# Patient Record
Sex: FEMALE | Race: WHITE | NOT HISPANIC OR LATINO | Employment: OTHER | URBAN - METROPOLITAN AREA
[De-identification: names, ages, dates, MRNs, and addresses within clinical notes are randomized per-mention and may not be internally consistent; named-entity substitution may affect disease eponyms.]

---

## 2021-08-19 ENCOUNTER — LAB (OUTPATIENT)
Dept: LAB | Facility: CLINIC | Age: 86
End: 2021-08-19
Payer: COMMERCIAL

## 2021-08-19 DIAGNOSIS — Z79.01 LONG TERM (CURRENT) USE OF ANTICOAGULANTS: Primary | ICD-10-CM

## 2021-08-19 LAB — INR BLD: 1.1 (ref 0.9–1.1)

## 2021-08-19 PROCEDURE — 85610 PROTHROMBIN TIME: CPT

## 2021-08-19 PROCEDURE — 36416 COLLJ CAPILLARY BLOOD SPEC: CPT

## 2021-08-21 ENCOUNTER — HOSPITAL ENCOUNTER (EMERGENCY)
Facility: CLINIC | Age: 86
Discharge: HOME | End: 2021-08-22
Attending: NURSE PRACTITIONER | Admitting: NURSE PRACTITIONER
Payer: COMMERCIAL

## 2021-08-21 ENCOUNTER — APPOINTMENT (OUTPATIENT)
Dept: GENERAL RADIOLOGY | Facility: CLINIC | Age: 86
End: 2021-08-21
Attending: NURSE PRACTITIONER
Payer: COMMERCIAL

## 2021-08-21 VITALS
RESPIRATION RATE: 22 BRPM | SYSTOLIC BLOOD PRESSURE: 154 MMHG | HEART RATE: 89 BPM | OXYGEN SATURATION: 93 % | DIASTOLIC BLOOD PRESSURE: 83 MMHG | TEMPERATURE: 99.3 F | WEIGHT: 185 LBS

## 2021-08-21 DIAGNOSIS — K59.00 CONSTIPATION: ICD-10-CM

## 2021-08-21 PROCEDURE — 250N000013 HC RX MED GY IP 250 OP 250 PS 637: Performed by: NURSE PRACTITIONER

## 2021-08-21 PROCEDURE — 74019 RADEX ABDOMEN 2 VIEWS: CPT

## 2021-08-21 PROCEDURE — 99283 EMERGENCY DEPT VISIT LOW MDM: CPT

## 2021-08-21 PROCEDURE — 99284 EMERGENCY DEPT VISIT MOD MDM: CPT | Performed by: NURSE PRACTITIONER

## 2021-08-21 RX ORDER — CEFADROXIL 500 MG/1
1000 CAPSULE ORAL
COMMUNITY
Start: 2021-08-18 | End: 2021-08-23

## 2021-08-21 RX ORDER — OMEPRAZOLE 40 MG/1
40 CAPSULE, DELAYED RELEASE ORAL
COMMUNITY
Start: 2020-08-03

## 2021-08-21 RX ORDER — CALCIUM POLYCARBOPHIL 625 MG
625 TABLET ORAL
COMMUNITY

## 2021-08-21 RX ORDER — CHLORAL HYDRATE 500 MG
1 CAPSULE ORAL
COMMUNITY

## 2021-08-21 RX ORDER — ALENDRONATE SODIUM 70 MG/1
70 TABLET ORAL
COMMUNITY
Start: 2021-02-02

## 2021-08-21 RX ORDER — HYDROCODONE BITARTRATE AND ACETAMINOPHEN 5; 325 MG/1; MG/1
TABLET ORAL
COMMUNITY
Start: 2021-08-18

## 2021-08-21 RX ORDER — ALBUTEROL SULFATE 90 UG/1
2 AEROSOL, METERED RESPIRATORY (INHALATION)
COMMUNITY

## 2021-08-21 RX ORDER — PRAVASTATIN SODIUM 80 MG/1
80 TABLET ORAL
COMMUNITY
Start: 2020-11-24

## 2021-08-21 RX ORDER — AMOXICILLIN 250 MG
1 CAPSULE ORAL AT BEDTIME
Qty: 20 TABLET | Refills: 0 | Status: SHIPPED | OUTPATIENT
Start: 2021-08-21

## 2021-08-21 RX ORDER — ALPRAZOLAM 0.25 MG
0.25 TABLET ORAL
COMMUNITY
Start: 2021-03-11

## 2021-08-21 RX ORDER — FLUOXETINE 40 MG/1
80 CAPSULE ORAL DAILY
COMMUNITY

## 2021-08-21 RX ORDER — ACETAMINOPHEN 325 MG/1
650 TABLET ORAL
COMMUNITY
Start: 2021-08-18 | End: 2021-08-24

## 2021-08-21 RX ORDER — AMOXICILLIN 250 MG
2 CAPSULE ORAL ONCE
Status: COMPLETED | OUTPATIENT
Start: 2021-08-21 | End: 2021-08-22

## 2021-08-21 RX ORDER — TRAZODONE HYDROCHLORIDE 100 MG/1
50 TABLET ORAL
COMMUNITY
Start: 2020-08-03

## 2021-08-21 RX ORDER — WARFARIN SODIUM 5 MG/1
5 TABLET ORAL DAILY
COMMUNITY
Start: 2021-05-24

## 2021-08-21 RX ADMIN — DOCUSATE SODIUM 286 ML: 50 LIQUID ORAL at 23:09

## 2021-08-21 ASSESSMENT — ENCOUNTER SYMPTOMS
SLEEP DISTURBANCE: 0
CHILLS: 0
COUGH: 0
BRUISES/BLEEDS EASILY: 1
HEADACHES: 0
DIAPHORESIS: 0
ABDOMINAL DISTENTION: 1
SHORTNESS OF BREATH: 0
LIGHT-HEADEDNESS: 0
ARTHRALGIAS: 1

## 2021-08-22 PROCEDURE — 250N000013 HC RX MED GY IP 250 OP 250 PS 637: Performed by: NURSE PRACTITIONER

## 2021-08-22 RX ADMIN — SENNOSIDES-DOCUSATE SODIUM TAB 8.6-50 MG 2 TABLET: 8.6-5 TAB at 00:06

## 2021-08-22 NOTE — ED TRIAGE NOTES
Patient fell on Monday, she had a radial/ulnar fractures that were surgically repaired. She was discharged from the hospital on Wednesday. She has not had a BM since then. She is also having low grade fevers.

## 2021-08-22 NOTE — ED PROVIDER NOTES
Triage Note  [id number] Patient fell on Monday, she had a radial/ulnar fractures that were surgically repaired. She was discharged from the hospital on Wednesday. She has not had a BM since then. She is also having low grade fevers.       History     Chief Complaint   Patient presents with     Post-op Problem     HPI  Whitley Roa is a 88 year old female who fell on Monday and had external fixator placement to right wrist 8/16/2021 on Percocet for pain presenting with no bowel movement since 8/16/2021 and now noting lower back pain with nausea concerning for ileus vs SBO. She reports she was passing gas up till today. She took Miralax and prune juice over past 2 days without change.     PCP: No Ref-Primary, Physician     Allergies:  No Known Allergies    Problem List:    There are no problems to display for this patient.       Past Medical History:    No past medical history on file.    Past Surgical History:    No past surgical history on file.    Family History:    No family history on file.    Social History:  Marital Status:  Single [1]  Social History     Tobacco Use     Smoking status: Not on file   Substance Use Topics     Alcohol use: Not on file     Drug use: Not on file        Medications:    acetaminophen (TYLENOL) 325 MG tablet  alendronate (FOSAMAX) 70 MG tablet  cefadroxil (DURICEF) 500 MG capsule  enoxaparin ANTICOAGULANT (LOVENOX) 80 MG/0.8ML syringe  omeprazole (PRILOSEC) 40 MG DR capsule  pravastatin (PRAVACHOL) 80 MG tablet  Probiotic Product (MISC INTESTINAL SADIQ REGULAT) CAPS  senna-docusate (SENOKOT-S/PERICOLACE) 8.6-50 MG tablet  traZODone (DESYREL) 100 MG tablet  albuterol (PROAIR HFA/PROVENTIL HFA/VENTOLIN HFA) 108 (90 Base) MCG/ACT inhaler  ALPRAZolam (XANAX) 0.25 MG tablet  Calcium Carb-Cholecalciferol 600-800 MG-UNIT TABS  Calcium Polycarbophil (FIBER) 625 MG tablet  cholecalciferol (VITAMIN D3) 25 mcg (1000 units) capsule  fish oil-omega-3 fatty acids 1000 MG capsule  FLUoxetine (PROZAC)  40 MG capsule  HYDROcodone-acetaminophen (NORCO) 5-325 MG tablet  warfarin ANTICOAGULANT (COUMADIN) 5 MG tablet          Review of Systems   Constitutional: Negative for chills and diaphoresis.   HENT: Negative.    Respiratory: Negative for cough and shortness of breath.    Gastrointestinal: Positive for abdominal distention.   Genitourinary: Negative.    Musculoskeletal: Positive for arthralgias.   Skin: Negative for pallor.   Neurological: Negative for light-headedness and headaches.   Hematological: Bruises/bleeds easily.   Psychiatric/Behavioral: Negative for sleep disturbance.       Physical Exam   BP: (!) 154/83  Pulse: 89  Temp: 99.3  F (37.4  C)  Resp: 22  Weight: 83.9 kg (185 lb)  SpO2: 93 %      Physical Exam  Vitals and nursing note reviewed.   Constitutional:       Appearance: Normal appearance.   HENT:      Head: Normocephalic and atraumatic.   Eyes:      Conjunctiva/sclera: Conjunctivae normal.   Cardiovascular:      Rate and Rhythm: Normal rate and regular rhythm.      Pulses: Normal pulses.      Heart sounds: Normal heart sounds.   Pulmonary:      Effort: Pulmonary effort is normal.      Breath sounds: Normal breath sounds.   Abdominal:      General: Bowel sounds are increased. There is distension.      Palpations: Abdomen is soft.      Tenderness: There is no abdominal tenderness.       Skin:     General: Skin is warm and dry.      Findings: Bruising present.      Comments: Bruising and swelling to her right hand and arm there is a cast in place to the right wrist neurovascular intact.   Neurological:      Mental Status: She is alert.         ED Course  Discussed with patient and daughter would like to obtain xray abdominal series eval for ileus vs bowel obstruction prior to considering enema    8861 patient had a moderate hard stool post enema.  She reports she is feeling already much better.  She received to Yanet-Colace capsules here in the ED.  Instructed continue with MiraLAX in the morning.   She is to take a Yanet-Colace capsule every night while she continues on oxycodone for her wrist pain post surgery.  Instructed to increase her oral intake of fluids to assist the MiraLAX and working.  All questions answered and patient discharged home in stable condition.        Procedures                Results for orders placed or performed during the hospital encounter of 08/21/21 (from the past 24 hour(s))   XR Abdomen 2 Views    Narrative    ABDOMEN TWO-THREE VIEW  8/21/2021 9:56 PM     HISTORY: constipation nausea; eval for SBO vs ileus s/p surgery 5 days  ago    COMPARISON: None.    FINDINGS: Large amount of stool. No free air. Nonspecific bowel gas  pattern, difficult to tell if some air-filled loops are colon or small  bowel. The colon is not distended. The lung bases are unremarkable.      Impression    IMPRESSION: Nonspecific bowel gas pattern.    MANSOOR LOYA MD         SYSTEM ID:  [id number]       Medications   senna-docusate (SENOKOT-S/PERICOLACE) 8.6-50 MG per tablet 2 tablet (has no administration in time range)   pink lady enema (COMPOUNDED: docusate, magnesium citrate, mineral oil, sodium phosphate) (286 mLs Rectal Given 8/21/21 5043)       Assessments & Plan (with Medical Decision Making)     I have reviewed the nursing notes.    I have reviewed the findings, diagnosis, plan and need for follow up with the patient.      New Prescriptions    SENNA-DOCUSATE (SENOKOT-S/PERICOLACE) 8.6-50 MG TABLET    Take 1 tablet by mouth At Bedtime       Final diagnoses:   Constipation       8/21/2021   [facility] EMERGENCY DEPT     Irma Londono, SANDOR CNP  08/21/21 5271

## 2021-08-24 ENCOUNTER — HOSPITAL ENCOUNTER (EMERGENCY)
Facility: CLINIC | Age: 86
Discharge: HOME | End: 2021-08-24
Attending: NURSE PRACTITIONER | Admitting: NURSE PRACTITIONER
Payer: COMMERCIAL

## 2021-08-24 VITALS
RESPIRATION RATE: 20 BRPM | DIASTOLIC BLOOD PRESSURE: 63 MMHG | TEMPERATURE: 98.4 F | SYSTOLIC BLOOD PRESSURE: 122 MMHG | OXYGEN SATURATION: 99 % | HEART RATE: 87 BPM | WEIGHT: 185 LBS

## 2021-08-24 DIAGNOSIS — K92.1 BLACK STOOL: ICD-10-CM

## 2021-08-24 LAB
ANION GAP SERPL CALCULATED.3IONS-SCNC: 1 MMOL/L (ref 3–14)
BASOPHILS # BLD AUTO: 0 10E3/UL (ref 0–0.2)
BASOPHILS NFR BLD AUTO: 1 %
BUN SERPL-MCNC: 15 MG/DL (ref 7–30)
CALCIUM SERPL-MCNC: 8.7 MG/DL (ref 8.5–10.1)
CHLORIDE BLD-SCNC: 105 MMOL/L (ref 94–109)
CO2 SERPL-SCNC: 31 MMOL/L (ref 20–32)
CREAT SERPL-MCNC: 0.74 MG/DL (ref 0.52–1.04)
EOSINOPHIL # BLD AUTO: 0.3 10E3/UL (ref 0–0.7)
EOSINOPHIL NFR BLD AUTO: 6 %
ERYTHROCYTE [DISTWIDTH] IN BLOOD BY AUTOMATED COUNT: 13.6 % (ref 10–15)
GFR SERPL CREATININE-BSD FRML MDRD: 73 ML/MIN/1.73M2
GLUCOSE BLD-MCNC: 143 MG/DL (ref 70–99)
HCT VFR BLD AUTO: 36 % (ref 35–47)
HGB BLD-MCNC: 11.9 G/DL (ref 11.7–15.7)
IMM GRANULOCYTES # BLD: 0 10E3/UL
IMM GRANULOCYTES NFR BLD: 1 %
INR PPP: 1.62 (ref 0.85–1.15)
LYMPHOCYTES # BLD AUTO: 0.8 10E3/UL (ref 0.8–5.3)
LYMPHOCYTES NFR BLD AUTO: 15 %
MCH RBC QN AUTO: 30.9 PG (ref 26.5–33)
MCHC RBC AUTO-ENTMCNC: 33.1 G/DL (ref 31.5–36.5)
MCV RBC AUTO: 94 FL (ref 78–100)
MONOCYTES # BLD AUTO: 0.7 10E3/UL (ref 0–1.3)
MONOCYTES NFR BLD AUTO: 13 %
NEUTROPHILS # BLD AUTO: 3.4 10E3/UL (ref 1.6–8.3)
NEUTROPHILS NFR BLD AUTO: 64 %
NRBC # BLD AUTO: 0 10E3/UL
NRBC BLD AUTO-RTO: 0 /100
PLATELET # BLD AUTO: 205 10E3/UL (ref 150–450)
POTASSIUM BLD-SCNC: 4.2 MMOL/L (ref 3.4–5.3)
RBC # BLD AUTO: 3.85 10E6/UL (ref 3.8–5.2)
SODIUM SERPL-SCNC: 137 MMOL/L (ref 133–144)
WBC # BLD AUTO: 5.2 10E3/UL (ref 4–11)

## 2021-08-24 PROCEDURE — 36415 COLL VENOUS BLD VENIPUNCTURE: CPT | Performed by: NURSE PRACTITIONER

## 2021-08-24 PROCEDURE — 85025 COMPLETE CBC W/AUTO DIFF WBC: CPT | Performed by: NURSE PRACTITIONER

## 2021-08-24 PROCEDURE — 99282 EMERGENCY DEPT VISIT SF MDM: CPT | Performed by: NURSE PRACTITIONER

## 2021-08-24 PROCEDURE — 99283 EMERGENCY DEPT VISIT LOW MDM: CPT | Performed by: NURSE PRACTITIONER

## 2021-08-24 PROCEDURE — 85610 PROTHROMBIN TIME: CPT | Performed by: NURSE PRACTITIONER

## 2021-08-24 PROCEDURE — 80048 BASIC METABOLIC PNL TOTAL CA: CPT | Performed by: NURSE PRACTITIONER

## 2021-08-24 RX ORDER — FLUOXETINE 40 MG/1
40 CAPSULE ORAL AT BEDTIME
COMMUNITY

## 2021-08-24 NOTE — DISCHARGE INSTRUCTIONS
Hemoglobin is normal (11.9).  INR today is 1.62  Follow-up with your orthopedic surgeon tomorrow as planned.  Return to the emergency department for increased black or having bloody stools, abdominal pain, vomiting, or any new symptoms of concern.

## 2021-08-24 NOTE — ED TRIAGE NOTES
Pt presents with tarry stools that started this a.m. Pt denies pain at this time. Pt was seen in this ED for constipation on Saturday. Pt had recent right wrist surgery from fall .Pt had this surgery in [place].

## 2021-08-24 NOTE — ED PROVIDER NOTES
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  Lives in [place].  Last week she fell suffering a right radius fracture requiring surgery.  She had surgery on 8/16/2021 at [facility].  Patient was subsequently started back on her Coumadin with Lovenox to bridge with Coumadin.  Patient is currently staying there in this area with her daughter since her surgery.  Patient was evaluated here in the emergency department 3 days ago for constipation and was given an enema with good results.  She states she continues to have daily bowel movements.  However, today she did have a large bowel movement that appeared to be black stool.  Patient denies any abdominal pain.  Denies diarrhea.  Patient has an appointment with her orthopedic surgeon tomorrow, which is a 3-hour car trip.  Patient spoke with the nurse at the surgery take today who was concerned about her possible melena stool today.  Patient was instructed to go to the emergency department for evaluation and should have a another INR and hemoglobin checked.    Patient adamant that she is NOT here to be evaluated for black stools today. She wants to have her labs done so that she knows if she is safe for travel to her appointment tomorrow and is worried about elevated INR.    Allergies:  Allergies   Allergen Reactions     No Known Allergies        Problem List:    There are no problems to display for this patient.       Past Medical History:    No past medical history on file.    Past Surgical History:    No past surgical history on file.    Family History:    No family history on file.    Social History:  Marital Status:  Single [1]  Social History     Tobacco Use      Smoking status: Never Smoker   Substance Use Topics     Alcohol use: Not Currently     Drug use: Never        Medications:    albuterol (PROAIR HFA/PROVENTIL HFA/VENTOLIN HFA) 108 (90 Base) MCG/ACT inhaler  alendronate (FOSAMAX) 70 MG tablet  ALPRAZolam (XANAX) 0.25 MG tablet  Calcium Carb-Cholecalciferol 600-800 MG-UNIT TABS  Calcium Polycarbophil (FIBER) 625 MG tablet  cholecalciferol (VITAMIN D3) 25 mcg (1000 units) capsule  fish oil-omega-3 fatty acids 1000 MG capsule  FLUoxetine (PROZAC) 40 MG capsule  FLUoxetine (PROZAC) 40 MG capsule  HYDROcodone-acetaminophen (NORCO) 5-325 MG tablet  omeprazole (PRILOSEC) 40 MG DR capsule  pravastatin (PRAVACHOL) 80 MG tablet  senna-docusate (SENOKOT-S/PERICOLACE) 8.6-50 MG tablet  traZODone (DESYREL) 100 MG tablet  warfarin ANTICOAGULANT (COUMADIN) 5 MG tablet  Probiotic Product (MISC INTESTINAL SADIQ REGULAT) CAPS          Review of Systems  As mentioned above in the history present illness. All other systems were reviewed and are negative.    Physical Exam   BP: 122/63  Pulse: 87  Temp: 98.4  F (36.9  C)  Resp: 20  Weight: 83.9 kg (185 lb)  SpO2: 99 %      Physical Exam  Constitutional:       General: She is not in acute distress.     Appearance: Normal appearance. She is well-developed. She is not ill-appearing.      Comments: She is tearful. (feeling overwhelmed)   HENT:      Head: Normocephalic and atraumatic.      Right Ear: External ear normal.      Left Ear: External ear normal.      Nose: Nose normal.      Mouth/Throat:      Pharynx: No oropharyngeal exudate.   Eyes:      Conjunctiva/sclera: Conjunctivae normal.   Cardiovascular:      Rate and Rhythm: Normal rate and regular rhythm.      Heart sounds: Normal heart sounds. No murmur heard.     Pulmonary:      Effort: Pulmonary effort is normal. No respiratory distress.      Breath sounds: Normal breath sounds.   Abdominal:      General: Bowel sounds are normal. There is no distension.      Palpations: Abdomen is  soft.      Tenderness: There is no abdominal tenderness.   Musculoskeletal:         General: Normal range of motion.   Skin:     General: Skin is warm and dry.      Findings: No rash.   Neurological:      General: No focal deficit present.      Mental Status: She is alert and oriented to person, place, and time.         ED Course        Procedures              Results for orders placed or performed during the hospital encounter of 08/24/21 (from the past 24 hour(s))   CBC with platelets differential    Narrative    The following orders were created for panel order CBC with platelets differential.  Procedure                               Abnormality         Status                     ---------                               -----------         ------                     CBC with platelets and d...[[id number]]                      Final result                 Please view results for these tests on the individual orders.   Basic metabolic panel   Result Value Ref Range    Sodium 137 133 - 144 mmol/L    Potassium 4.2 3.4 - 5.3 mmol/L    Chloride 105 94 - 109 mmol/L    Carbon Dioxide (CO2) 31 20 - 32 mmol/L    Anion Gap 1 (L) 3 - 14 mmol/L    Urea Nitrogen 15 7 - 30 mg/dL    Creatinine 0.74 0.52 - 1.04 mg/dL    Calcium 8.7 8.5 - 10.1 mg/dL    Glucose 143 (H) 70 - 99 mg/dL    GFR Estimate 73 >60 mL/min/1.73m2   INR   Result Value Ref Range    INR 1.62 (H) 0.85 - 1.15   CBC with platelets and differential   Result Value Ref Range    WBC Count 5.2 4.0 - 11.0 10e3/uL    RBC Count 3.85 3.80 - 5.20 10e6/uL    Hemoglobin 11.9 11.7 - 15.7 g/dL    Hematocrit 36.0 35.0 - 47.0 %    MCV 94 78 - 100 fL    MCH 30.9 26.5 - 33.0 pg    MCHC 33.1 31.5 - 36.5 g/dL    RDW 13.6 10.0 - 15.0 %    Platelet Count 205 150 - 450 10e3/uL    % Neutrophils 64 %    % Lymphocytes 15 %    % Monocytes 13 %    % Eosinophils 6 %    % Basophils 1 %    % Immature Granulocytes 1 %    NRBCs per 100 WBC 0 <1 /100    Absolute Neutrophils 3.4 1.6 - 8.3 10e3/uL     Absolute Lymphocytes 0.8 0.8 - 5.3 10e3/uL    Absolute Monocytes 0.7 0.0 - 1.3 10e3/uL    Absolute Eosinophils 0.3 0.0 - 0.7 10e3/uL    Absolute Basophils 0.0 0.0 - 0.2 10e3/uL    Absolute Immature Granulocytes 0.0 <=0.0 10e3/uL    Absolute NRBCs 0.0 10e3/uL       Medications - No data to display    Assessments & Plan (with Medical Decision Making)   Hemoglobin is normal (11.9).  INR today is 1.62  Follow-up with your orthopedic surgeon tomorrow as planned.  Return to the emergency department for increased black or having bloody stools, abdominal pain, vomiting, or any new symptoms of concern.     I have reviewed the nursing notes.    I have reviewed the findings, diagnosis, plan and need for follow up with the patient.      Discharge Medication List as of 8/24/2021  4:34 PM          Final diagnoses:   Black stool - one episode of dark stool today. no bright red blood per rectum       8/24/2021   [facility] EMERGENCY DEPT     Courtney Marc APRN CNP  08/24/21 5809

## 2021-08-27 ENCOUNTER — LAB (OUTPATIENT)
Dept: LAB | Facility: CLINIC | Age: 86
End: 2021-08-27
Payer: COMMERCIAL

## 2021-08-27 DIAGNOSIS — Z79.01 LONG TERM (CURRENT) USE OF ANTICOAGULANTS: ICD-10-CM

## 2021-08-27 LAB — INR BLD: 2.4 (ref 0.9–1.1)

## 2021-08-27 PROCEDURE — 85610 PROTHROMBIN TIME: CPT

## 2021-09-01 DIAGNOSIS — Z79.01 LONG TERM (CURRENT) USE OF ANTICOAGULANTS: Primary | ICD-10-CM

## 2021-09-01 DIAGNOSIS — Z86.718 HISTORY OF DVT (DEEP VEIN THROMBOSIS): ICD-10-CM

## 2021-09-02 DIAGNOSIS — Z79.01 LONG TERM (CURRENT) USE OF ANTICOAGULANTS: Primary | ICD-10-CM

## 2021-09-02 DIAGNOSIS — Z86.718 HISTORY OF DVT (DEEP VEIN THROMBOSIS): ICD-10-CM

## 2021-10-17 ENCOUNTER — HEALTH MAINTENANCE LETTER (OUTPATIENT)
Age: 86
End: 2021-10-17

## 2022-02-14 ENCOUNTER — LAB REQUISITION (OUTPATIENT)
Dept: LAB | Age: 87
End: 2022-02-14

## 2022-02-14 DIAGNOSIS — Z01.812 ENCOUNTER FOR PREPROCEDURAL LABORATORY EXAMINATION: ICD-10-CM

## 2022-02-14 DIAGNOSIS — Z20.822 CONTACT WITH AND (SUSPECTED) EXPOSURE TO COVID-19: ICD-10-CM

## 2022-02-14 PROCEDURE — PSEU10635 2019 NOVEL CORONAVIRUS (SARS-COV-2): Performed by: CLINICAL MEDICAL LABORATORY

## 2022-02-14 PROCEDURE — U0003 INFECTIOUS AGENT DETECTION BY NUCLEIC ACID (DNA OR RNA); SEVERE ACUTE RESPIRATORY SYNDROME CORONAVIRUS 2 (SARS-COV-2) (CORONAVIRUS DISEASE [COVID-19]), AMPLIFIED PROBE TECHNIQUE, MAKING USE OF HIGH THROUGHPUT TECHNOLOGIES AS DESCRIBED BY CMS-2020-01-R: HCPCS | Performed by: CLINICAL MEDICAL LABORATORY

## 2022-02-14 PROCEDURE — U0005 INFEC AGEN DETEC AMPLI PROBE: HCPCS | Performed by: CLINICAL MEDICAL LABORATORY

## 2022-02-15 LAB
SARS-COV-2 RNA RESP QL NAA+PROBE: NOT DETECTED
SERVICE CMNT-IMP: NORMAL
SERVICE CMNT-IMP: NORMAL

## 2022-02-17 ENCOUNTER — LAB REQUISITION (OUTPATIENT)
Dept: LAB | Age: 87
End: 2022-02-17

## 2022-02-17 DIAGNOSIS — Z01.812 ENCOUNTER FOR PREPROCEDURAL LABORATORY EXAMINATION: ICD-10-CM

## 2022-02-17 DIAGNOSIS — K11.8 OTHER DISEASES OF SALIVARY GLANDS: ICD-10-CM

## 2022-02-17 DIAGNOSIS — Z20.822 CONTACT WITH AND (SUSPECTED) EXPOSURE TO COVID-19: ICD-10-CM

## 2022-02-17 PROCEDURE — 88305 TISSUE EXAM BY PATHOLOGIST: CPT | Performed by: CLINICAL MEDICAL LABORATORY

## 2022-02-17 PROCEDURE — 88307 TISSUE EXAM BY PATHOLOGIST: CPT | Performed by: CLINICAL MEDICAL LABORATORY

## 2022-02-17 PROCEDURE — 88331 PATH CONSLTJ SURG 1 BLK 1SPC: CPT | Performed by: CLINICAL MEDICAL LABORATORY

## 2022-02-18 LAB
CASE RPRT: NORMAL
CLINICAL INFO: NORMAL
PATH REPORT.FINAL DX SPEC: NORMAL
PATH REPORT.GROSS SPEC: NORMAL
PATH REPORT.INTRAOP OBS SPEC DOC: NORMAL
PATH REPORT.MICROSCOPIC SPEC OTHER STN: NORMAL

## 2022-10-03 ENCOUNTER — HEALTH MAINTENANCE LETTER (OUTPATIENT)
Age: 87
End: 2022-10-03

## 2023-02-11 ENCOUNTER — HEALTH MAINTENANCE LETTER (OUTPATIENT)
Age: 88
End: 2023-02-11

## 2024-03-09 ENCOUNTER — HEALTH MAINTENANCE LETTER (OUTPATIENT)
Age: 89
End: 2024-03-09